# Patient Record
Sex: FEMALE | Race: WHITE | Employment: OTHER | URBAN - METROPOLITAN AREA
[De-identification: names, ages, dates, MRNs, and addresses within clinical notes are randomized per-mention and may not be internally consistent; named-entity substitution may affect disease eponyms.]

---

## 2020-08-24 ENCOUNTER — NEW PATIENT (OUTPATIENT)
Dept: URBAN - METROPOLITAN AREA CLINIC 33 | Facility: CLINIC | Age: 70
End: 2020-08-24

## 2020-08-24 VITALS
HEART RATE: 57 BPM | SYSTOLIC BLOOD PRESSURE: 126 MMHG | DIASTOLIC BLOOD PRESSURE: 75 MMHG | WEIGHT: 240 LBS | HEIGHT: 63 IN | BODY MASS INDEX: 42.52 KG/M2

## 2020-08-24 DIAGNOSIS — H26.491: ICD-10-CM

## 2020-08-24 DIAGNOSIS — H35.373: ICD-10-CM

## 2020-08-24 DIAGNOSIS — H43.393: ICD-10-CM

## 2020-08-24 DIAGNOSIS — H25.12: ICD-10-CM

## 2020-08-24 PROCEDURE — 92134 CPTRZ OPH DX IMG PST SGM RTA: CPT

## 2020-08-24 PROCEDURE — 99204 OFFICE O/P NEW MOD 45 MIN: CPT

## 2020-08-24 PROCEDURE — 92250 FUNDUS PHOTOGRAPHY W/I&R: CPT

## 2020-08-24 ASSESSMENT — VISUAL ACUITY
OD_PH: 20/50-2
OS_SC: 20/40+2
OD_SC: 20/80+2
OS_PH: 20/25+1

## 2020-08-24 ASSESSMENT — TONOMETRY
OS_IOP_MMHG: 11
OD_IOP_MMHG: 13

## 2022-02-03 ENCOUNTER — OFFICE VISIT (OUTPATIENT)
Dept: URBAN - METROPOLITAN AREA CLINIC 66 | Facility: CLINIC | Age: 72
End: 2022-02-03

## 2022-06-04 ENCOUNTER — TELEPHONE ENCOUNTER (OUTPATIENT)
Dept: URBAN - METROPOLITAN AREA CLINIC 68 | Facility: CLINIC | Age: 72
End: 2022-06-04

## 2022-06-05 ENCOUNTER — TELEPHONE ENCOUNTER (OUTPATIENT)
Dept: URBAN - METROPOLITAN AREA CLINIC 68 | Facility: CLINIC | Age: 72
End: 2022-06-05

## 2022-06-25 ENCOUNTER — TELEPHONE ENCOUNTER (OUTPATIENT)
Age: 72
End: 2022-06-25

## 2022-06-26 ENCOUNTER — TELEPHONE ENCOUNTER (OUTPATIENT)
Age: 72
End: 2022-06-26

## 2023-02-23 ENCOUNTER — OFFICE VISIT (OUTPATIENT)
Dept: URBAN - METROPOLITAN AREA CLINIC 66 | Facility: CLINIC | Age: 73
End: 2023-02-23

## 2023-02-23 RX ORDER — ATORVASTATIN CALCIUM 80 MG/1
1 TABLET TABLET, FILM COATED ORAL ONCE A DAY
Status: ACTIVE | COMMUNITY

## 2023-02-23 RX ORDER — METOPROLOL SUCCINATE 25 MG/1
1 TABLET TABLET, FILM COATED, EXTENDED RELEASE ORAL ONCE A DAY
Status: ACTIVE | COMMUNITY

## 2023-02-23 RX ORDER — DULOXETINE 60 MG/1
1 CAPSULE CAPSULE, DELAYED RELEASE PELLETS ORAL ONCE A DAY
Status: ACTIVE | COMMUNITY

## 2023-02-23 RX ORDER — HYDROCHLOROTHIAZIDE 25 MG/1
1 TABLET IN THE MORNING TABLET ORAL ONCE A DAY
Status: ACTIVE | COMMUNITY

## 2023-02-23 RX ORDER — SOD SULF/POT CHLORIDE/MAG SULF 1.479 G
AS DIRECTED TABLET ORAL ONCE
Qty: 1 KIT | OUTPATIENT

## 2023-02-23 NOTE — HPI-MIGRATED HPI
Transition to Care : 72 y.o. WF with chronic back pain and history of PE following hip surgery in the past who is here for a screening colonoscopy. She had a colonoscopy > 10 years ago which showed colon polyp. She is no longer on Coumadin therapy. She denies any abdominal pain, no n/v, no gib. She did have some Chest pain following [facility] and did have a negative cardiac work up at [facility]. A CT chest done in 9/2022 showed no pulmonary embolus.

## 2023-02-23 NOTE — EXAM-MIGRATED EXAMINATIONS
General Examination: Extremities: -> normal extremity with no clubbing, cyanosis or edema   Neurologic: -> alert and oriented   Neck / thyroid: -> neck is supple, with full range of motion and no cervical lymphadenopathy   Skin: -> skin is warm and dry, with no rashes, good skin turgor and normal hair distribution   Heart: -> regular rate and rhythm without murmurs, gallops, clicks or rubs   Lungs: -> clear to auscultation bilaterally, with good air movement and no rales, rhonchi or wheezes   Chest: -> chest wall with no costochondral junction tenderness, no rib deformity and normal shape and expansion   Abdomen: -> soft with good bowel sounds, nontender, and no masses or hepatosplenomegaly , negative Murray's sign   General appearance: -> alert, pleasant, well-nourished and in no acute distress   Head: -> normocephalic, atraumatic   Eyes: -> normal

## 2023-03-09 ENCOUNTER — TELEPHONE ENCOUNTER (OUTPATIENT)
Dept: URBAN - METROPOLITAN AREA CLINIC 68 | Facility: CLINIC | Age: 73
End: 2023-03-09

## 2023-03-20 ENCOUNTER — OFFICE VISIT (OUTPATIENT)
Dept: URBAN - METROPOLITAN AREA SURGERY CENTER 12 | Facility: SURGERY CENTER | Age: 73
End: 2023-03-20

## 2023-04-25 ENCOUNTER — OFFICE VISIT (OUTPATIENT)
Dept: URBAN - METROPOLITAN AREA SURGERY CENTER 12 | Facility: SURGERY CENTER | Age: 73
End: 2023-04-25

## 2023-04-25 ENCOUNTER — DASHBOARD ENCOUNTERS (OUTPATIENT)
Age: 73
End: 2023-04-25

## 2023-04-25 RX ORDER — SOD SULF/POT CHLORIDE/MAG SULF 1.479 G
AS DIRECTED TABLET ORAL ONCE
Qty: 1 KIT | Status: ACTIVE | COMMUNITY

## 2023-04-25 RX ORDER — DULOXETINE 60 MG/1
1 CAPSULE CAPSULE, DELAYED RELEASE PELLETS ORAL ONCE A DAY
Status: ACTIVE | COMMUNITY

## 2023-04-25 RX ORDER — METOPROLOL SUCCINATE 25 MG/1
1 TABLET TABLET, FILM COATED, EXTENDED RELEASE ORAL ONCE A DAY
Status: ACTIVE | COMMUNITY

## 2023-04-25 RX ORDER — HYDROCHLOROTHIAZIDE 25 MG/1
1 TABLET IN THE MORNING TABLET ORAL ONCE A DAY
Status: ACTIVE | COMMUNITY

## 2023-04-25 RX ORDER — ATORVASTATIN CALCIUM 80 MG/1
1 TABLET TABLET, FILM COATED ORAL ONCE A DAY
Status: ACTIVE | COMMUNITY

## 2023-05-03 ENCOUNTER — TELEPHONE ENCOUNTER (OUTPATIENT)
Dept: URBAN - METROPOLITAN AREA CLINIC 68 | Facility: CLINIC | Age: 73
End: 2023-05-03